# Patient Record
Sex: MALE | Race: OTHER | NOT HISPANIC OR LATINO | URBAN - METROPOLITAN AREA
[De-identification: names, ages, dates, MRNs, and addresses within clinical notes are randomized per-mention and may not be internally consistent; named-entity substitution may affect disease eponyms.]

---

## 2019-06-13 ENCOUNTER — APPOINTMENT (RX ONLY)
Dept: URBAN - METROPOLITAN AREA CLINIC 349 | Facility: CLINIC | Age: 84
Setting detail: DERMATOLOGY
End: 2019-06-13

## 2019-06-13 DIAGNOSIS — L23.9 ALLERGIC CONTACT DERMATITIS, UNSPECIFIED CAUSE: ICD-10-CM

## 2019-06-13 PROCEDURE — ? PRESCRIPTION

## 2019-06-13 PROCEDURE — 99202 OFFICE O/P NEW SF 15 MIN: CPT

## 2019-06-13 PROCEDURE — ? TREATMENT REGIMEN

## 2019-06-13 PROCEDURE — ? KOH PREP

## 2019-06-13 RX ORDER — FLUTICASONE PROPIONATE 0.05 MG/G
OINTMENT TOPICAL
Qty: 1 | Refills: 1 | Status: ERX | COMMUNITY
Start: 2019-06-13

## 2019-06-13 RX ADMIN — FLUTICASONE PROPIONATE: 0.05 OINTMENT TOPICAL at 16:50

## 2019-06-13 ASSESSMENT — LOCATION ZONE DERM: LOCATION ZONE: ARM

## 2019-06-13 ASSESSMENT — LOCATION SIMPLE DESCRIPTION DERM: LOCATION SIMPLE: RIGHT FOREARM

## 2019-06-13 ASSESSMENT — LOCATION DETAILED DESCRIPTION DERM: LOCATION DETAILED: RIGHT PROXIMAL RADIAL DORSAL FOREARM

## 2019-07-15 ENCOUNTER — APPOINTMENT (RX ONLY)
Dept: URBAN - METROPOLITAN AREA CLINIC 349 | Facility: CLINIC | Age: 84
Setting detail: DERMATOLOGY
End: 2019-07-15

## 2019-07-15 DIAGNOSIS — D22 MELANOCYTIC NEVI: ICD-10-CM

## 2019-07-15 DIAGNOSIS — L82.0 INFLAMED SEBORRHEIC KERATOSIS: ICD-10-CM

## 2019-07-15 DIAGNOSIS — L23.9 ALLERGIC CONTACT DERMATITIS, UNSPECIFIED CAUSE: ICD-10-CM | Status: IMPROVED

## 2019-07-15 PROBLEM — D22.5 MELANOCYTIC NEVI OF TRUNK: Status: ACTIVE | Noted: 2019-07-15

## 2019-07-15 PROBLEM — E03.9 HYPOTHYROIDISM, UNSPECIFIED: Status: ACTIVE | Noted: 2019-07-15

## 2019-07-15 PROBLEM — D22.71 MELANOCYTIC NEVI OF RIGHT LOWER LIMB, INCLUDING HIP: Status: ACTIVE | Noted: 2019-07-15

## 2019-07-15 PROBLEM — N40.0 BENIGN PROSTATIC HYPERPLASIA WITHOUT LOWER URINARY TRACT SYMPTOMS: Status: ACTIVE | Noted: 2019-07-15

## 2019-07-15 PROCEDURE — A4550 SURGICAL TRAYS: HCPCS

## 2019-07-15 PROCEDURE — ? COUNSELING

## 2019-07-15 PROCEDURE — 11301 SHAVE SKIN LESION 0.6-1.0 CM: CPT | Mod: 76

## 2019-07-15 PROCEDURE — 88305 TISSUE EXAM BY PATHOLOGIST: CPT

## 2019-07-15 PROCEDURE — ? PATHOLOGY BILLING

## 2019-07-15 PROCEDURE — 99213 OFFICE O/P EST LOW 20 MIN: CPT | Mod: 25

## 2019-07-15 PROCEDURE — 11301 SHAVE SKIN LESION 0.6-1.0 CM: CPT

## 2019-07-15 PROCEDURE — ? TREATMENT REGIMEN

## 2019-07-15 PROCEDURE — ? SHAVE REMOVAL

## 2019-07-15 ASSESSMENT — LOCATION SIMPLE DESCRIPTION DERM
LOCATION SIMPLE: LEFT UPPER BACK
LOCATION SIMPLE: ABDOMEN
LOCATION SIMPLE: LEFT UPPER BACK
LOCATION SIMPLE: ABDOMEN
LOCATION SIMPLE: ABDOMEN
LOCATION SIMPLE: RIGHT THIGH
LOCATION SIMPLE: LEFT UPPER BACK

## 2019-07-15 ASSESSMENT — LOCATION DETAILED DESCRIPTION DERM
LOCATION DETAILED: LEFT LATERAL ABDOMEN
LOCATION DETAILED: LEFT SUPERIOR UPPER BACK
LOCATION DETAILED: RIGHT ANTERIOR MEDIAL PROXIMAL THIGH
LOCATION DETAILED: RIGHT ANTERIOR PROXIMAL THIGH
LOCATION DETAILED: LEFT SUPERIOR UPPER BACK
LOCATION DETAILED: LEFT LATERAL ABDOMEN
LOCATION DETAILED: RIGHT ANTERIOR PROXIMAL THIGH
LOCATION DETAILED: LEFT LATERAL ABDOMEN
LOCATION DETAILED: LEFT SUPERIOR UPPER BACK

## 2019-07-15 ASSESSMENT — LOCATION ZONE DERM
LOCATION ZONE: TRUNK
LOCATION ZONE: LEG
LOCATION ZONE: TRUNK
LOCATION ZONE: TRUNK
LOCATION ZONE: LEG
LOCATION ZONE: LEG

## 2019-07-15 NOTE — PROCEDURE: PATHOLOGY BILLING
Immunohistochemistry ([id number] and [id number]) billing is not performed here. Please use the Immunohistochemistry Stain Billing plan to accomplish this.

## 2019-07-15 NOTE — PROCEDURE: PATHOLOGY BILLING
Immunohistochemistry ([id number] and [id number]) billing is not performed here. Please use the Immunohistochemistry Stain Billing plan to accomplish this.

## 2019-07-15 NOTE — PROCEDURE: SHAVE REMOVAL
Biopsy Method: Dermablade
Anesthesia Volume In Cc: 0.5
Hemostasis: Electrocautery
Size Of Lesion In Cm (Required): 0.9
Medical Necessity Clause: This procedure was medically necessary because the lesion that was treated was: changing
Anesthesia Type: 2% lidocaine with epinephrine
Medical Necessity Information: It is in your best interest to select a reason for this procedure from the list below. All of these items fulfill various CMS LCD requirements except the new and changing color options.
Post-Care Instructions: I reviewed with the patient in detail post-care instructions. Patient is to keep the biopsy site dry overnight, and then apply vaseline twice daily until healed. Patient may apply hydrogen peroxide soaks to remove any crusting. After the procedure, the patient was observed for 5-10 minutes and was oriented to person, place and time and demied feeling dizzy, queasy, and stated that they did not feel that they were going to faint.
Notification Instructions: Patient will be notified of biopsy results. However, patient instructed to call the office if not contacted within 2 weeks.
Render Path Notes In Note?: No
Was A Bandage Applied: Yes
Accession #: [id number] [email or web address] read
Consent was obtained from the patient. The risks and benefits to therapy were discussed in detail. Specifically, the risks of infection, scarring, bleeding, prolonged wound healing, incomplete removal, allergy to anesthesia, nerve injury and recurrence were addressed. Prior to the procedure, the treatment site was clearly identified and confirmed by the patient. All components of Universal Protocol/PAUSE Rule completed.
Wound Care: Vaseline
Size Of Lesion In Cm (Required): 0.7
X Size Of Lesion In Cm (Optional): 0
Size Of Lesion In Cm (Required): 1
Billing Type: Third-Party Bill
Detail Level: Detailed

## 2019-07-15 NOTE — PROCEDURE: PATHOLOGY BILLING
Immunohistochemistry ([id number] and [id number]) billing is not performed here. Please use the Immunohistochemistry Stain Billing plan to accomplish this. Immunohistochemistry ([id number] and [id number]) billing is not performed here. Please use the Immunohistochemistry Stain Billing plan to accomplish this.

## 2019-09-05 ENCOUNTER — APPOINTMENT (RX ONLY)
Dept: URBAN - METROPOLITAN AREA CLINIC 349 | Facility: CLINIC | Age: 84
Setting detail: DERMATOLOGY
End: 2019-09-05

## 2019-09-05 DIAGNOSIS — L23.9 ALLERGIC CONTACT DERMATITIS, UNSPECIFIED CAUSE: ICD-10-CM | Status: IMPROVED

## 2019-09-05 DIAGNOSIS — L08.0 PYODERMA: ICD-10-CM

## 2019-09-05 PROCEDURE — ? PRESCRIPTION

## 2019-09-05 PROCEDURE — 99213 OFFICE O/P EST LOW 20 MIN: CPT

## 2019-09-05 PROCEDURE — ? TREATMENT REGIMEN

## 2019-09-05 PROCEDURE — ? COUNSELING

## 2019-09-05 RX ORDER — CEPHALEXIN 500 MG/1
CAPSULE ORAL
Qty: 20 | Refills: 0 | Status: ERX | COMMUNITY
Start: 2019-09-05

## 2019-09-05 RX ORDER — MUPIROCIN 20 MG/G
OINTMENT TOPICAL
Qty: 1 | Refills: 0 | Status: ERX | COMMUNITY
Start: 2019-09-05

## 2019-09-05 RX ADMIN — CEPHALEXIN: 500 CAPSULE ORAL at 15:58

## 2019-09-05 RX ADMIN — MUPIROCIN: 20 OINTMENT TOPICAL at 15:57

## 2019-09-05 ASSESSMENT — LOCATION ZONE DERM: LOCATION ZONE: ARM

## 2019-09-05 ASSESSMENT — LOCATION SIMPLE DESCRIPTION DERM: LOCATION SIMPLE: RIGHT FOREARM

## 2019-09-05 ASSESSMENT — LOCATION DETAILED DESCRIPTION DERM: LOCATION DETAILED: RIGHT PROXIMAL DORSAL FOREARM

## 2019-10-07 ENCOUNTER — APPOINTMENT (RX ONLY)
Dept: URBAN - METROPOLITAN AREA CLINIC 349 | Facility: CLINIC | Age: 84
Setting detail: DERMATOLOGY
End: 2019-10-07

## 2019-10-07 DIAGNOSIS — L08.0 PYODERMA: ICD-10-CM | Status: IMPROVED

## 2019-10-07 DIAGNOSIS — L23.9 ALLERGIC CONTACT DERMATITIS, UNSPECIFIED CAUSE: ICD-10-CM | Status: IMPROVED

## 2019-10-07 DIAGNOSIS — B35.1 TINEA UNGUIUM: ICD-10-CM

## 2019-10-07 PROBLEM — G40.909 EPILEPSY, UNSPECIFIED, NOT INTRACTABLE, WITHOUT STATUS EPILEPTICUS: Status: ACTIVE | Noted: 2019-10-07

## 2019-10-07 PROBLEM — F32.9 MAJOR DEPRESSIVE DISORDER, SINGLE EPISODE, UNSPECIFIED: Status: ACTIVE | Noted: 2019-10-07

## 2019-10-07 PROCEDURE — ? TREATMENT REGIMEN

## 2019-10-07 PROCEDURE — ? RECOMMENDATIONS

## 2019-10-07 PROCEDURE — ? COUNSELING

## 2019-10-07 PROCEDURE — ? PRESCRIPTION

## 2019-10-07 PROCEDURE — 99213 OFFICE O/P EST LOW 20 MIN: CPT

## 2019-10-07 RX ORDER — FLUTICASONE PROPIONATE 0.05 MG/G
OINTMENT TOPICAL
Qty: 1 | Refills: 2 | Status: ERX

## 2019-10-07 ASSESSMENT — LOCATION ZONE DERM
LOCATION ZONE: TRUNK
LOCATION ZONE: TOENAIL
LOCATION ZONE: ARM
LOCATION ZONE: ARM

## 2019-10-07 ASSESSMENT — LOCATION SIMPLE DESCRIPTION DERM
LOCATION SIMPLE: LEFT GREAT TOE
LOCATION SIMPLE: RIGHT FOREARM
LOCATION SIMPLE: RIGHT GREAT TOE
LOCATION SIMPLE: LEFT UPPER BACK
LOCATION SIMPLE: RIGHT FOREARM

## 2019-10-07 ASSESSMENT — LOCATION DETAILED DESCRIPTION DERM
LOCATION DETAILED: RIGHT GREAT TOENAIL
LOCATION DETAILED: RIGHT PROXIMAL DORSAL FOREARM
LOCATION DETAILED: RIGHT PROXIMAL DORSAL FOREARM
LOCATION DETAILED: LEFT GREAT TOENAIL
LOCATION DETAILED: LEFT SUPERIOR UPPER BACK

## 2019-10-07 NOTE — PROCEDURE: TREATMENT REGIMEN
Continue Regimen: Fluticasone ointment
Continue Regimen: Fluticasone when flaring \\nMupuricin
Detail Level: Zone
Continue Regimen: Fluticasone ointment twice daily when flaring

## 2020-02-04 ENCOUNTER — RX ONLY (OUTPATIENT)
Age: 85
Setting detail: RX ONLY
End: 2020-02-04

## 2020-02-04 ENCOUNTER — APPOINTMENT (RX ONLY)
Dept: URBAN - METROPOLITAN AREA CLINIC 349 | Facility: CLINIC | Age: 85
Setting detail: DERMATOLOGY
End: 2020-02-04

## 2020-02-04 DIAGNOSIS — L23.9 ALLERGIC CONTACT DERMATITIS, UNSPECIFIED CAUSE: ICD-10-CM

## 2020-02-04 DIAGNOSIS — L08.0 PYODERMA: ICD-10-CM

## 2020-02-04 PROCEDURE — ? TREATMENT REGIMEN

## 2020-02-04 PROCEDURE — ? PRESCRIPTION

## 2020-02-04 PROCEDURE — 99213 OFFICE O/P EST LOW 20 MIN: CPT

## 2020-02-04 PROCEDURE — ? COUNSELING

## 2020-02-04 RX ORDER — FLUTICASONE PROPIONATE 0.05 MG/G
OINTMENT TOPICAL
Qty: 1 | Refills: 2 | Status: ERX

## 2020-02-04 RX ORDER — TRIAMCINOLONE ACETONIDE 1 MG/G
CREAM TOPICAL
Qty: 1 | Refills: 2 | Status: ERX | COMMUNITY
Start: 2020-02-04

## 2020-02-04 ASSESSMENT — LOCATION SIMPLE DESCRIPTION DERM
LOCATION SIMPLE: RIGHT FOREARM
LOCATION SIMPLE: RIGHT FOREARM
LOCATION SIMPLE: LEFT UPPER BACK

## 2020-02-04 ASSESSMENT — BSA RASH: BSA RASH: 20

## 2020-02-04 ASSESSMENT — LOCATION DETAILED DESCRIPTION DERM
LOCATION DETAILED: RIGHT PROXIMAL DORSAL FOREARM
LOCATION DETAILED: RIGHT PROXIMAL DORSAL FOREARM
LOCATION DETAILED: LEFT SUPERIOR UPPER BACK

## 2020-02-04 ASSESSMENT — LOCATION ZONE DERM
LOCATION ZONE: TRUNK
LOCATION ZONE: ARM
LOCATION ZONE: ARM

## 2020-02-04 NOTE — PROCEDURE: TREATMENT REGIMEN
Continue Regimen: Fluticasone ointment
Continue Regimen: Fluticasone ointment twice daily when flaring
Detail Level: Zone
Continue Regimen: Fluticasone when flaring \\nMupuricin
Continue Regimen: Fluticasone ointment to flaring patches
Initiate Treatment: Triamcinolone cream
Otc Regimen: Dove soap

## 2020-12-14 ENCOUNTER — RX ONLY (OUTPATIENT)
Age: 85
Setting detail: RX ONLY
End: 2020-12-14

## 2020-12-14 RX ORDER — TRIAMCINOLONE ACETONIDE 1 MG/G
CREAM TOPICAL
Qty: 1 | Refills: 2 | Status: ERX

## 2020-12-14 RX ORDER — FLUTICASONE PROPIONATE 0.05 MG/G
OINTMENT TOPICAL
Qty: 1 | Refills: 2 | Status: ERX

## 2022-03-18 PROBLEM — K40.20 NON-RECURRENT BILATERAL INGUINAL HERNIA WITHOUT OBSTRUCTION OR GANGRENE: Status: ACTIVE | Noted: 2020-12-09

## 2022-03-19 PROBLEM — R33.9 INCOMPLETE EMPTYING OF BLADDER: Status: ACTIVE | Noted: 2020-12-09

## 2022-03-19 PROBLEM — R41.3 MEMORY LOSS: Status: ACTIVE | Noted: 2021-01-04

## 2022-03-19 PROBLEM — Z86.19 HISTORY OF GONORRHEA: Status: ACTIVE | Noted: 2020-12-09

## 2022-03-20 PROBLEM — F31.77 BIPOLAR DISORDER, IN PARTIAL REMISSION, MOST RECENT EPISODE MIXED (HCC): Status: ACTIVE | Noted: 2020-08-18

## 2022-06-13 ENCOUNTER — OFFICE VISIT (OUTPATIENT)
Dept: CARDIOLOGY CLINIC | Age: 87
End: 2022-06-13
Payer: MEDICARE

## 2022-06-13 VITALS
WEIGHT: 141.8 LBS | HEART RATE: 67 BPM | BODY MASS INDEX: 21.49 KG/M2 | SYSTOLIC BLOOD PRESSURE: 118 MMHG | DIASTOLIC BLOOD PRESSURE: 66 MMHG | HEIGHT: 68 IN

## 2022-06-13 DIAGNOSIS — E03.8 HYPOTHYROIDISM DUE TO HASHIMOTO'S THYROIDITIS: ICD-10-CM

## 2022-06-13 DIAGNOSIS — Z76.89 ESTABLISHING CARE WITH NEW DOCTOR, ENCOUNTER FOR: Primary | ICD-10-CM

## 2022-06-13 DIAGNOSIS — E06.3 HYPOTHYROIDISM DUE TO HASHIMOTO'S THYROIDITIS: ICD-10-CM

## 2022-06-13 PROCEDURE — G8428 CUR MEDS NOT DOCUMENT: HCPCS | Performed by: INTERNAL MEDICINE

## 2022-06-13 PROCEDURE — G8420 CALC BMI NORM PARAMETERS: HCPCS | Performed by: INTERNAL MEDICINE

## 2022-06-13 PROCEDURE — 1036F TOBACCO NON-USER: CPT | Performed by: INTERNAL MEDICINE

## 2022-06-13 PROCEDURE — 1123F ACP DISCUSS/DSCN MKR DOCD: CPT | Performed by: INTERNAL MEDICINE

## 2022-06-13 PROCEDURE — 93000 ELECTROCARDIOGRAM COMPLETE: CPT | Performed by: INTERNAL MEDICINE

## 2022-06-13 PROCEDURE — 99214 OFFICE O/P EST MOD 30 MIN: CPT | Performed by: INTERNAL MEDICINE

## 2022-06-13 ASSESSMENT — ENCOUNTER SYMPTOMS
APHONIA: 0
NAIL CHANGES: 0
STRIDOR: 0
EYE PAIN: 0
COUGH: 0
ABDOMINAL PAIN: 0

## 2022-06-13 NOTE — PROGRESS NOTES
[address], [address] [facility], [address] [place] [address]  PHONE: [phone]    SUBJECTIVE:   Paulina Zayas is a 82 y.o. male 1935   seen for a consultation visit regarding the following:     Chief Complaint   Patient presents with    New Patient     Est care              Consultation is requested by for evaluation of New Patient (Est care)   . History of present illness: 82 y.o. male presented for consultation 6/13/2022 previous history of hypothyroidism on thyroid supplementation. Previously seen by [facility] endocrinology in 2021. No chest discomfort or shortness of breath. Patient originally from [facility] and moved to [facility] in [address]. Worked as an     Cardiac History:  6/13/2022 EKG normal sinus rhythm normal axis normal IA interval    Assessment:    Hypothyroidism   · last thyroid function studies reviewed as well as note from previous endocrinology visit. · Patient requests referral to Dr. Homa Easton with [facility] endocrinology. Bipolar disorder  · Controlled      Past Medical History, Past Surgical History, Family history, Social History, and Medications were all reviewed with the patient today and updated as necessary.        No Known Allergies  Past Medical History:   Diagnosis Date    Bipolar disorder ([address]     Cataract     Cervical strain     Depression     Fatigue     GERD (gastroesophageal reflux disease)     Hashimoto's thyroiditis     Hypertrophy of prostate without urinary obstruction and other lower urinary tract symptoms (LUTS)     Memory loss     Obstructive sleep apnea     Primary hypothyroidism     Urinary incontinence      Past Surgical History:   Procedure Laterality Date    TONSILLECTOMY       Family History   Problem Relation Age of Onset    Diabetes Mother     Psychiatric Disorder Father     Psychiatric Disorder Paternal Aunt     Thyroid Disease Neg Hx      Social History     Tobacco Use    Smoking status: Never Smoker    Smokeless tobacco: Never Used   Substance Use Topics    Alcohol use: No       ROS:    Review of Systems   Constitutional: Negative for fever. HENT: Negative for stridor. Eyes: Negative for pain. Cardiovascular: Negative for chest pain. Respiratory: Negative for cough. Endocrine: Negative for cold intolerance. Skin: Negative for nail changes. Musculoskeletal: Negative for arthritis. Gastrointestinal: Negative for abdominal pain. Genitourinary: Negative for dysuria. Neurological: Negative for aphonia. Psychiatric/Behavioral: Negative for altered mental status. Allergic/Immunologic: Negative for hives. PHYSICAL EXAM:   /66   Pulse 67   Ht 5' 8\" (1.727 m)   Wt 141 lb 12.8 oz (64.3 kg)   BMI 21.56 kg/m²      Physical Exam  Vitals reviewed. HENT:      Head: Normocephalic. Right Ear: External ear normal.      Left Ear: External ear normal.      Nose: Nose normal.   Eyes:      General: No scleral icterus. Pulmonary:      Effort: Pulmonary effort is normal.   Abdominal:      General: There is no distension. Musculoskeletal:      Cervical back: Neck supple. Skin:     General: Skin is warm. Neurological:      Mental Status: He is alert. Mental status is at baseline. Medical problems and test results were reviewed with the patient today. No results found for any visits on 06/13/22. No results found for this or any previous visit (from the past 672 hour(s)). Lab Results   Component Value Date    CHOL 129 10/06/2021    HDL 56 10/06/2021    VLDL 11 10/06/2021            OSWALD    [place] was seen today for new patient. Diagnoses and all orders for this visit:    Establishing care with new doctor, encounter for  -     EKG 12 Lead    Hypothyroidism due to Hashimoto's thyroiditis  -     External Referral to Endocrinology        Return in about 1 year (around 6/13/2023). Thank you for allowing me to participate in this patient's care.   Please call or contact me if there are any questions or concerns regarding the above.       Rubi Delcid MD  06/13/22  2:40 PM      Proofread, but unrecognized errors may exist.

## 2024-05-22 ENCOUNTER — OFFICE VISIT (OUTPATIENT)
Age: 89
End: 2024-05-22
Payer: MEDICARE

## 2024-05-22 VITALS
WEIGHT: 138 LBS | DIASTOLIC BLOOD PRESSURE: 60 MMHG | SYSTOLIC BLOOD PRESSURE: 118 MMHG | HEIGHT: 68 IN | HEART RATE: 56 BPM | BODY MASS INDEX: 20.92 KG/M2

## 2024-05-22 DIAGNOSIS — R07.2 CHEST PAIN, PRECORDIAL: ICD-10-CM

## 2024-05-22 DIAGNOSIS — E03.8 HYPOTHYROIDISM DUE TO HASHIMOTO'S THYROIDITIS: Primary | ICD-10-CM

## 2024-05-22 DIAGNOSIS — E06.3 HYPOTHYROIDISM DUE TO HASHIMOTO'S THYROIDITIS: Primary | ICD-10-CM

## 2024-05-22 PROCEDURE — G8420 CALC BMI NORM PARAMETERS: HCPCS | Performed by: INTERNAL MEDICINE

## 2024-05-22 PROCEDURE — G8427 DOCREV CUR MEDS BY ELIG CLIN: HCPCS | Performed by: INTERNAL MEDICINE

## 2024-05-22 PROCEDURE — 1123F ACP DISCUSS/DSCN MKR DOCD: CPT | Performed by: INTERNAL MEDICINE

## 2024-05-22 PROCEDURE — 1036F TOBACCO NON-USER: CPT | Performed by: INTERNAL MEDICINE

## 2024-05-22 PROCEDURE — 93000 ELECTROCARDIOGRAM COMPLETE: CPT | Performed by: INTERNAL MEDICINE

## 2024-05-22 PROCEDURE — 99214 OFFICE O/P EST MOD 30 MIN: CPT | Performed by: INTERNAL MEDICINE

## 2024-05-22 ASSESSMENT — ENCOUNTER SYMPTOMS
ABDOMINAL PAIN: 0
NAIL CHANGES: 0
EYE PAIN: 0
COUGH: 0
APHONIA: 0
STRIDOR: 0

## 2024-05-22 NOTE — PROGRESS NOTES
[address] [facility], [place] [address]  PHONE: [phone]    SUBJECTIVE:   Dav Zhu is a 88 y.o. male 1935   seen for a consultation visit regarding the following:     Chief Complaint   Patient presents with    Annual Exam     Hypothyroidism due to Hashimoto's thyroiditis            .    History of present illness: 88 y.o. male presented for consultation 6/13/2022 previous history of hypothyroidism on thyroid supplementation.  Previously seen by [facility] endocrinology in 2021.  No chest discomfort or shortness of breath.  Patient originally from [place] and moved to [place] in [place].  Worked as an     Cardiac History:  6/13/2022 EKG normal sinus rhythm normal axis normal [place] interval  5/22/2025 sinus rhythm, normal rate, normal [place] intervals, ST wave normal, normal axis,   Assessment:    Hypothyroidism   last thyroid function studies reviewed as well as note from previous endocrinology visit.  Patient requests referral to Dr. Hope Gallegos with [facility] endocrinology.  Bipolar disorder  Controlled      Past Medical History, Past Surgical History, Family history, Social History, and Medications were all reviewed with the patient today and updated as necessary.       No Known Allergies  Past Medical History:   Diagnosis Date    Bipolar disorder (HCC)     Cataract     Cervical strain     Depression     Fatigue     GERD (gastroesophageal reflux disease)     Hashimoto's thyroiditis     Hypertrophy of prostate without urinary obstruction and other lower urinary tract symptoms (LUTS)     Memory loss     Obstructive sleep apnea     Primary hypothyroidism     Urinary incontinence      Past Surgical History:   Procedure Laterality Date    TONSILLECTOMY       Family History   Problem Relation Age of Onset    Diabetes Mother     Psychiatric Disorder Father     Psychiatric Disorder Paternal Aunt     Thyroid Disease Neg Hx      Social History     Tobacco Use    Smoking status: Never

## 2024-06-12 ENCOUNTER — TELEPHONE (OUTPATIENT)
Age: 89
End: 2024-06-12

## 2024-06-12 NOTE — TELEPHONE ENCOUNTER
I called the pt back and talked to the pts wife Kaylah. She says that she has physical therapy and they will be busy. She also stated that the pt is feeling better so we can cancel the stress test. I told her that if the pt has had chest pain it is important to get the test done soon.  She stated her last surgery will be on 7/28/2024 so the pt can come in after that date. I told her that I will have someone call her back to reschedule.

## 2024-06-12 NOTE — TELEPHONE ENCOUNTER
Pt wife called into the check out line and wanted to move pt nst to nov. Is this ok to move to this late?

## 2024-12-10 ENCOUNTER — TELEPHONE (OUTPATIENT)
Age: 89
End: 2024-12-10

## 2024-12-10 NOTE — TELEPHONE ENCOUNTER
Pt.reports BP usually low 100's.BP is running 125-130/60's 3-4 days a week.When BP get over low 100's he says he feels really bad and can hardly walk.I tried to explain to him BP is not high but he says he feels really bad and needs to know what to do w/these higher readings.He is not on any BP medication.Please advise me what to have him do.

## 2024-12-11 NOTE — TELEPHONE ENCOUNTER
The \"high\" blood pressures are within normal limits lower blood pressures may be an issue if he is feeling bad.  Does he need an appointment?

## 2024-12-12 ENCOUNTER — OFFICE VISIT (OUTPATIENT)
Age: 89
End: 2024-12-12
Payer: MEDICARE

## 2024-12-12 VITALS
HEART RATE: 63 BPM | SYSTOLIC BLOOD PRESSURE: 120 MMHG | OXYGEN SATURATION: 99 % | BODY MASS INDEX: 22.16 KG/M2 | WEIGHT: 146.2 LBS | DIASTOLIC BLOOD PRESSURE: 64 MMHG | HEIGHT: 68 IN

## 2024-12-12 DIAGNOSIS — I95.1 ORTHOSTATIC INTOLERANCE: ICD-10-CM

## 2024-12-12 DIAGNOSIS — R03.0 ELEVATED BP WITHOUT DIAGNOSIS OF HYPERTENSION: ICD-10-CM

## 2024-12-12 DIAGNOSIS — E06.3 HYPOTHYROIDISM DUE TO HASHIMOTO'S THYROIDITIS: Primary | ICD-10-CM

## 2024-12-12 DIAGNOSIS — E03.8 OTHER SPECIFIED HYPOTHYROIDISM: ICD-10-CM

## 2024-12-12 LAB
ANION GAP SERPL CALC-SCNC: 6 MMOL/L (ref 7–16)
BUN SERPL-MCNC: 19 MG/DL (ref 8–23)
CALCIUM SERPL-MCNC: 9.3 MG/DL (ref 8.8–10.2)
CHLORIDE SERPL-SCNC: 102 MMOL/L (ref 98–107)
CO2 SERPL-SCNC: 33 MMOL/L (ref 20–29)
CORTIS AM PEAK SERPL-MCNC: 8.4 UG/DL (ref 4.8–19.5)
CREAT SERPL-MCNC: 1.03 MG/DL (ref 0.8–1.3)
GLUCOSE SERPL-MCNC: 59 MG/DL (ref 70–99)
HGB BLD-MCNC: 13 G/DL (ref 13.6–17.2)
POTASSIUM SERPL-SCNC: 5.1 MMOL/L (ref 3.5–5.1)
SODIUM SERPL-SCNC: 140 MMOL/L (ref 136–145)
TSH W FREE THYROID IF ABNORMAL: 0.8 UIU/ML (ref 0.27–4.2)

## 2024-12-12 PROCEDURE — 99214 OFFICE O/P EST MOD 30 MIN: CPT | Performed by: INTERNAL MEDICINE

## 2024-12-12 PROCEDURE — 1036F TOBACCO NON-USER: CPT | Performed by: INTERNAL MEDICINE

## 2024-12-12 PROCEDURE — 1126F AMNT PAIN NOTED NONE PRSNT: CPT | Performed by: INTERNAL MEDICINE

## 2024-12-12 PROCEDURE — 1123F ACP DISCUSS/DSCN MKR DOCD: CPT | Performed by: INTERNAL MEDICINE

## 2024-12-12 PROCEDURE — G8428 CUR MEDS NOT DOCUMENT: HCPCS | Performed by: INTERNAL MEDICINE

## 2024-12-12 PROCEDURE — G8484 FLU IMMUNIZE NO ADMIN: HCPCS | Performed by: INTERNAL MEDICINE

## 2024-12-12 PROCEDURE — G8420 CALC BMI NORM PARAMETERS: HCPCS | Performed by: INTERNAL MEDICINE

## 2024-12-12 NOTE — PROGRESS NOTES
[facility] CARDIOLOGY, [address], [address]  [place] [address]  PHONE: [phone]    SUBJECTIVE:   Dav Zhu is a 89 y.o. male 1935   seen for a follow up visit regarding the following:     Chief Complaint   Patient presents with    Blood Pressure Check         History of Present Illness  The patient is an 89-year-old male who presents for evaluation of elevated blood pressure without the diagnosis of hypertension, hypothyroidism, and bipolar disorder.    He reports a history of elevated blood pressure, with readings consistently around 120 over the past few days. He experiences difficulty ambulating when his blood pressure is low. His usual blood pressure is 110 or 105. He also reports episodes of dizziness upon standing, particularly when his blood pressure is elevated. This symptom has been persistent for several years. He uses an [employer] blood pressure monitor at home. He also reports balance issues when lowering his head. His daily routine includes walking, but he now requires a 30-minute rest period after moving around the house. He maintains hydration through tea consumption, drinking one cup per day, and also consumes two glasses of wine daily.    He has a long-standing history of thyroid issues, dating back to his childhood.    SOCIAL HISTORY  He drinks 2 glasses of wine a day.        Interval history:   consultation 6/13/2022 previous history of hypothyroidism on thyroid supplementation.  Previously seen by [facility] endocrinology in 2021.  No chest discomfort or shortness of breath.  Patient originally from [place] and moved to [place] in [place].  Worked as an      Cardiac History:  6/13/2022 EKG normal sinus rhythm normal axis normal [place] interval  5/22/2025 sinus rhythm, normal rate, normal [place] intervals, ST wave normal, normal axis,      Results         Assessment & Plan  1. Orthostatic intolerance  His blood pressure readings have been consistently within

## 2024-12-26 RX ORDER — NITROGLYCERIN 0.4 MG/1
0.4 TABLET SUBLINGUAL EVERY 5 MIN PRN
Qty: 25 TABLET | Refills: 11 | Status: SHIPPED | OUTPATIENT
Start: 2024-12-26

## 2024-12-26 NOTE — TELEPHONE ENCOUNTER
Requested Prescriptions     Pending Prescriptions Disp Refills    nitroGLYCERIN (NITROSTAT) 0.4 MG SL tablet 25 tablet 11     Sig: Place 1 tablet under the tongue every 5 minutes as needed for Chest pain

## 2024-12-26 NOTE — TELEPHONE ENCOUNTER
MEDICATION REFILL REQUEST      Name of Medication:  Nitroglycerin   Dose:  0.4 mg  Frequency:    Quantity:    Days' supply:        Pharmacy Name/Location:  did not leave

## 2025-01-06 ENCOUNTER — OFFICE VISIT (OUTPATIENT)
Age: 89
End: 2025-01-06
Payer: MEDICARE

## 2025-01-06 VITALS
SYSTOLIC BLOOD PRESSURE: 120 MMHG | OXYGEN SATURATION: 98 % | WEIGHT: 147 LBS | HEART RATE: 52 BPM | HEIGHT: 68 IN | DIASTOLIC BLOOD PRESSURE: 64 MMHG | BODY MASS INDEX: 22.28 KG/M2

## 2025-01-06 DIAGNOSIS — I95.1 ORTHOSTATIC INTOLERANCE: ICD-10-CM

## 2025-01-06 DIAGNOSIS — E06.3 HYPOTHYROIDISM DUE TO HASHIMOTO'S THYROIDITIS: Primary | ICD-10-CM

## 2025-01-06 PROCEDURE — 1123F ACP DISCUSS/DSCN MKR DOCD: CPT | Performed by: INTERNAL MEDICINE

## 2025-01-06 PROCEDURE — 1126F AMNT PAIN NOTED NONE PRSNT: CPT | Performed by: INTERNAL MEDICINE

## 2025-01-06 PROCEDURE — 99214 OFFICE O/P EST MOD 30 MIN: CPT | Performed by: INTERNAL MEDICINE

## 2025-01-06 PROCEDURE — 1036F TOBACCO NON-USER: CPT | Performed by: INTERNAL MEDICINE

## 2025-01-06 PROCEDURE — G8428 CUR MEDS NOT DOCUMENT: HCPCS | Performed by: INTERNAL MEDICINE

## 2025-01-06 PROCEDURE — G8420 CALC BMI NORM PARAMETERS: HCPCS | Performed by: INTERNAL MEDICINE

## 2025-01-06 RX ORDER — TAMSULOSIN HYDROCHLORIDE 0.4 MG/1
0.4 CAPSULE ORAL DAILY
COMMUNITY

## 2025-01-06 NOTE — PROGRESS NOTES
[facility] CARDIOLOGY, [address], [address]  [place] [address]  PHONE: [phone]    SUBJECTIVE:   Dav Zhu is a 89 y.o. male 1935   seen for a follow up visit regarding the following:     Chief Complaint   Patient presents with    Follow-up     3 weeks         History of Present Illness  The patient is an 89-year-old male with a medical history significant for orthostatic intolerance, hypothyroidism, bipolar disorder, and elevated blood pressure without a diagnosis of hypertension.    The patient reports a general sense of well-being and denies recent episodes of dizziness upon standing. His daily systolic blood pressure readings consistently approximate 120 mmHg, with no measurements falling below 110 mmHg. Symptom management primarily involves maintaining adequate hydration, although he finds this challenging. The use of compression stockings was attempted but resulted in discomfort. His exercise regimen includes a specific routine performed 20 times each morning; however, he notes that overexertion precipitates palpitations.          Interval history:   consultation 6/13/2022 previous history of hypothyroidism on thyroid supplementation.  Previously seen by [facility] endocrinology in 2021.  No chest discomfort or shortness of breath.  Patient originally from [place] and moved to [place] in [place].  Worked as an      Cardiac History:  6/13/2022 EKG normal sinus rhythm normal axis normal IA interval  5/22/2025 sinus rhythm, normal rate, normal IA intervals, ST wave normal, normal axis,      Results  - Thyroid, cortisol, and hemoglobin levels were within normal range.       Assessment & Plan  Orthostatic intolerance  - Blood pressure readings consistently normal with occasional elevations to 130-140 mmHg  - Dizziness when standing likely due to orthostasis  - Tilt table testing deferred  - No parkinsonian symptoms on exam  - Recommendations:    - Hydrate with 250

## 2025-03-05 ENCOUNTER — OFFICE VISIT (OUTPATIENT)
Dept: ENT CLINIC | Age: 89
End: 2025-03-05
Payer: MEDICARE

## 2025-03-05 VITALS — HEIGHT: 68 IN | RESPIRATION RATE: 16 BRPM | WEIGHT: 144 LBS | BODY MASS INDEX: 21.82 KG/M2

## 2025-03-05 DIAGNOSIS — J30.0 VASOMOTOR RHINITIS: ICD-10-CM

## 2025-03-05 DIAGNOSIS — H61.20 WAX IN EAR: ICD-10-CM

## 2025-03-05 DIAGNOSIS — J38.7 PRESBYLARYNX: Primary | ICD-10-CM

## 2025-03-05 PROCEDURE — 1126F AMNT PAIN NOTED NONE PRSNT: CPT | Performed by: STUDENT IN AN ORGANIZED HEALTH CARE EDUCATION/TRAINING PROGRAM

## 2025-03-05 PROCEDURE — 1036F TOBACCO NON-USER: CPT | Performed by: STUDENT IN AN ORGANIZED HEALTH CARE EDUCATION/TRAINING PROGRAM

## 2025-03-05 PROCEDURE — 1160F RVW MEDS BY RX/DR IN RCRD: CPT | Performed by: STUDENT IN AN ORGANIZED HEALTH CARE EDUCATION/TRAINING PROGRAM

## 2025-03-05 PROCEDURE — 99204 OFFICE O/P NEW MOD 45 MIN: CPT | Performed by: STUDENT IN AN ORGANIZED HEALTH CARE EDUCATION/TRAINING PROGRAM

## 2025-03-05 PROCEDURE — 69210 REMOVE IMPACTED EAR WAX UNI: CPT | Performed by: STUDENT IN AN ORGANIZED HEALTH CARE EDUCATION/TRAINING PROGRAM

## 2025-03-05 PROCEDURE — G8420 CALC BMI NORM PARAMETERS: HCPCS | Performed by: STUDENT IN AN ORGANIZED HEALTH CARE EDUCATION/TRAINING PROGRAM

## 2025-03-05 PROCEDURE — G8427 DOCREV CUR MEDS BY ELIG CLIN: HCPCS | Performed by: STUDENT IN AN ORGANIZED HEALTH CARE EDUCATION/TRAINING PROGRAM

## 2025-03-05 PROCEDURE — 1123F ACP DISCUSS/DSCN MKR DOCD: CPT | Performed by: STUDENT IN AN ORGANIZED HEALTH CARE EDUCATION/TRAINING PROGRAM

## 2025-03-05 PROCEDURE — 31575 DIAGNOSTIC LARYNGOSCOPY: CPT | Performed by: STUDENT IN AN ORGANIZED HEALTH CARE EDUCATION/TRAINING PROGRAM

## 2025-03-05 PROCEDURE — 1159F MED LIST DOCD IN RCRD: CPT | Performed by: STUDENT IN AN ORGANIZED HEALTH CARE EDUCATION/TRAINING PROGRAM

## 2025-03-05 RX ORDER — LEVOTHYROXINE SODIUM 100 UG/1
100 TABLET ORAL DAILY
COMMUNITY
Start: 2025-02-14

## 2025-03-05 ASSESSMENT — ENCOUNTER SYMPTOMS
FACIAL SWELLING: 0
WHEEZING: 0
COUGH: 0
SINUS PRESSURE: 0
NAUSEA: 0
CHOKING: 0
EYE ITCHING: 0
DIARRHEA: 0
SINUS PAIN: 0
CONSTIPATION: 0
EYE PAIN: 0
STRIDOR: 0
EYE DISCHARGE: 0
SHORTNESS OF BREATH: 0
APNEA: 0

## 2025-03-05 NOTE — PROGRESS NOTES
at that time.    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 Wax in ear  H61.20 REMOVE IMPACTED EAR WAX              Oliver Ambrocio MD  3/5/2025  Electronically signed    Note dictated using voice recognition software.  Please excuse any typos.

## 2025-06-30 ENCOUNTER — OFFICE VISIT (OUTPATIENT)
Age: 89
End: 2025-06-30
Payer: MEDICARE

## 2025-06-30 VITALS
HEART RATE: 59 BPM | WEIGHT: 141 LBS | BODY MASS INDEX: 21.37 KG/M2 | DIASTOLIC BLOOD PRESSURE: 62 MMHG | SYSTOLIC BLOOD PRESSURE: 94 MMHG | HEIGHT: 68 IN

## 2025-06-30 DIAGNOSIS — E61.1 IRON DEFICIENCY: ICD-10-CM

## 2025-06-30 DIAGNOSIS — I95.1 ORTHOSTATIC INTOLERANCE: Primary | ICD-10-CM

## 2025-06-30 LAB
FERRITIN SERPL-MCNC: 31 NG/ML (ref 8–388)
IRON SATN MFR SERPL: 22 % (ref 20–50)
IRON SERPL-MCNC: 67 UG/DL (ref 35–100)
TIBC SERPL-MCNC: 304 UG/DL (ref 240–450)
UIBC SERPL-MCNC: 237 UG/DL (ref 112–347)

## 2025-06-30 PROCEDURE — 93000 ELECTROCARDIOGRAM COMPLETE: CPT | Performed by: INTERNAL MEDICINE

## 2025-06-30 PROCEDURE — 1036F TOBACCO NON-USER: CPT | Performed by: INTERNAL MEDICINE

## 2025-06-30 PROCEDURE — 1126F AMNT PAIN NOTED NONE PRSNT: CPT | Performed by: INTERNAL MEDICINE

## 2025-06-30 PROCEDURE — 99214 OFFICE O/P EST MOD 30 MIN: CPT | Performed by: INTERNAL MEDICINE

## 2025-06-30 PROCEDURE — G8428 CUR MEDS NOT DOCUMENT: HCPCS | Performed by: INTERNAL MEDICINE

## 2025-06-30 PROCEDURE — G8420 CALC BMI NORM PARAMETERS: HCPCS | Performed by: INTERNAL MEDICINE

## 2025-06-30 PROCEDURE — 1123F ACP DISCUSS/DSCN MKR DOCD: CPT | Performed by: INTERNAL MEDICINE

## 2025-06-30 NOTE — PROGRESS NOTES
[address], [address]  [place] [address]  PHONE: [phone]    SUBJECTIVE:   Dav Zhu is a 89 y.o. male 1935   seen for a follow up visit regarding the following:     Chief Complaint   Patient presents with    Hypothyroidism         History of Present Illness  The patient, an 89-year-old individual, presents for evaluation of hypertension.    The patient reports a current blood pressure reading of 94/60 mmHg, which they perceive as abnormally low. They recall a previous episode of hypotension with a systolic blood pressure of approximately 50 mmHg, which caused significant distress. The highest recorded blood pressure was 112 mmHg, with a pulse pressure exceeding 40 mmHg. The patient consumes white wine, red meat, and broccoli to increase iron intake and expresses reluctance to initiate new antihypertensive medications. They currently use compression stockings.    During an electrocardiogram (EKG) performed today, the patient experienced severe discomfort and dizziness, which they attribute to potential iron deficiency or hypoxia. The patient also notes the presence of koilonychia (concave nails). Additionally, the patient reports two falls occurring three weeks ago: one while rising from the toilet and another while on a bus.    SOCIAL HISTORY  - Diet: Red meat, broccoli, sushi  - Alcohol: White wine        Interval history:       Results         Assessment & Plan  Orthostatic intolerance  - BP consistently low, today's reading 94/60 mmHg  - Experienced dizziness and falls, potentially related to low BP  - Continue compression stockings and increased fluid intake  - Caution with Flomax due to potential BP lowering  - Discussed potential BP-raising medications; patient prefers to avoid  - Discussed risks, benefits, and alternatives, including fall consequences    Dizziness:  - Reports dizziness, particularly when standing, leading to falls  - Continue

## 2025-07-02 ENCOUNTER — RESULTS FOLLOW-UP (OUTPATIENT)
Age: 89
End: 2025-07-02